# Patient Record
Sex: MALE | Race: OTHER
[De-identification: names, ages, dates, MRNs, and addresses within clinical notes are randomized per-mention and may not be internally consistent; named-entity substitution may affect disease eponyms.]

---

## 2020-03-26 ENCOUNTER — HOSPITAL ENCOUNTER (EMERGENCY)
Dept: HOSPITAL 74 - JER | Age: 40
Discharge: HOME | End: 2020-03-26
Payer: COMMERCIAL

## 2020-03-26 VITALS — SYSTOLIC BLOOD PRESSURE: 142 MMHG | DIASTOLIC BLOOD PRESSURE: 83 MMHG | HEART RATE: 65 BPM

## 2020-03-26 VITALS — TEMPERATURE: 98.4 F

## 2020-03-26 DIAGNOSIS — R05: Primary | ICD-10-CM

## 2020-03-26 PROCEDURE — U0002 COVID-19 LAB TEST NON-CDC: HCPCS

## 2020-03-26 NOTE — PDOC
Rapid Medical Evaluation


Time Seen by Provider: 03/26/20 16:47


Medical Evaluation: 





03/26/20 16:54


I performed a brief in-person evaluation of this patient.





Healthy 39-year old medical resident with 4 days of cough and sore throat


No CP or SOB





Pertinent physical exam findings:


Afebrile


Speaking in full sentences, ambulating without distress


Clear lungs


RRR, S1/S2





Dispo:


Tested for COVID-19 (healthcare [facility]


Supportive measures


Discussed isolation precautions and indications for ER return





**Discharge Disposition





- Diagnosis


 Cough








- Discharge Dispostion


Disposition: HOME


Condition at time of disposition: Stable


Decision to Admit order: No





- Referrals





- Patient Instructions


Printed Discharge Instructions:  [facility]-Coronavirus Instructions, [facility] COVID-19 Isolation Protocol


Additional Instructions: 


Return to the ER for difficulty breathing or any other life-threatening 

symptoms.





Follow the isolation protocol attached.





- Post Discharge Activity


Work/School Note:  Back to Work